# Patient Record
Sex: MALE | Race: WHITE | Employment: FULL TIME | URBAN - METROPOLITAN AREA
[De-identification: names, ages, dates, MRNs, and addresses within clinical notes are randomized per-mention and may not be internally consistent; named-entity substitution may affect disease eponyms.]

---

## 2021-03-02 ENCOUNTER — IMMUNIZATION (OUTPATIENT)
Dept: PRIMARY CARE CLINIC | Age: 56
End: 2021-03-02
Payer: COMMERCIAL

## 2021-03-02 PROCEDURE — 0011A COVID-19, MODERNA VACCINE 100MCG/0.5ML DOSE: CPT | Performed by: NURSE PRACTITIONER

## 2021-03-02 PROCEDURE — 91301 COVID-19, MODERNA VACCINE 100MCG/0.5ML DOSE: CPT | Performed by: NURSE PRACTITIONER

## 2021-03-30 ENCOUNTER — IMMUNIZATION (OUTPATIENT)
Dept: PRIMARY CARE CLINIC | Age: 56
End: 2021-03-30
Payer: COMMERCIAL

## 2021-03-30 PROCEDURE — 0012A COVID-19, MODERNA VACCINE 100MCG/0.5ML DOSE: CPT | Performed by: NURSE PRACTITIONER

## 2021-03-30 PROCEDURE — 91301 COVID-19, MODERNA VACCINE 100MCG/0.5ML DOSE: CPT | Performed by: NURSE PRACTITIONER

## 2021-11-10 ENCOUNTER — IMMUNIZATION (OUTPATIENT)
Dept: PRIMARY CARE CLINIC | Age: 56
End: 2021-11-10
Payer: COMMERCIAL

## 2021-11-10 PROCEDURE — 91306 COVID-19, MODERNA BOOSTER VACCINE 0.25ML DOSE: CPT | Performed by: NURSE PRACTITIONER

## 2021-11-10 PROCEDURE — 0064A COVID-19, MODERNA BOOSTER VACCINE 0.25ML DOSE: CPT | Performed by: NURSE PRACTITIONER

## 2022-05-02 ENCOUNTER — IMMUNIZATION (OUTPATIENT)
Dept: PRIMARY CARE CLINIC | Age: 57
End: 2022-05-02
Payer: COMMERCIAL

## 2022-05-02 PROCEDURE — 91306 COVID-19, MODERNA BOOSTER VACCINE 0.25ML DOSE: CPT | Performed by: NURSE PRACTITIONER

## 2022-05-02 PROCEDURE — 0064A COVID-19, MODERNA BOOSTER VACCINE 0.25ML DOSE: CPT | Performed by: NURSE PRACTITIONER

## 2024-01-29 ENCOUNTER — APPOINTMENT (OUTPATIENT)
Dept: GENERAL RADIOLOGY | Age: 59
End: 2024-01-29
Payer: COMMERCIAL

## 2024-01-29 ENCOUNTER — HOSPITAL ENCOUNTER (EMERGENCY)
Age: 59
Discharge: HOME | End: 2024-01-29
Attending: EMERGENCY MEDICINE
Payer: COMMERCIAL

## 2024-01-29 VITALS
OXYGEN SATURATION: 98 % | HEART RATE: 94 BPM | BODY MASS INDEX: 28.04 KG/M2 | TEMPERATURE: 98.4 F | RESPIRATION RATE: 15 BRPM | HEIGHT: 68 IN | DIASTOLIC BLOOD PRESSURE: 65 MMHG | SYSTOLIC BLOOD PRESSURE: 115 MMHG | WEIGHT: 185 LBS

## 2024-01-29 DIAGNOSIS — M25.561 ACUTE PAIN OF RIGHT KNEE: Primary | ICD-10-CM

## 2024-01-29 DIAGNOSIS — M25.461 EFFUSION OF RIGHT KNEE: ICD-10-CM

## 2024-01-29 LAB
ALBUMIN SERPL-MCNC: 3.9 G/DL (ref 3.5–5.2)
ALP SERPL-CCNC: 104 U/L (ref 40–129)
ALT SERPL-CCNC: 19 U/L (ref 0–40)
ANION GAP SERPL CALCULATED.3IONS-SCNC: 12 MMOL/L (ref 7–16)
AST SERPL-CCNC: 18 U/L (ref 0–39)
BASOPHILS # BLD: 0.03 K/UL (ref 0–0.2)
BASOPHILS NFR BLD: 0 % (ref 0–2)
BILIRUB SERPL-MCNC: 1.1 MG/DL (ref 0–1.2)
BUN SERPL-MCNC: 17 MG/DL (ref 6–23)
CALCIUM SERPL-MCNC: 9.3 MG/DL (ref 8.6–10.2)
CHLORIDE SERPL-SCNC: 98 MMOL/L (ref 98–107)
CO2 SERPL-SCNC: 25 MMOL/L (ref 22–29)
CREAT SERPL-MCNC: 0.8 MG/DL (ref 0.7–1.2)
CRP SERPL HS-MCNC: 171 MG/L (ref 0–5)
EOSINOPHIL # BLD: 0.02 K/UL (ref 0.05–0.5)
EOSINOPHILS RELATIVE PERCENT: 0 % (ref 0–6)
ERYTHROCYTE [DISTWIDTH] IN BLOOD BY AUTOMATED COUNT: 11.9 % (ref 11.5–15)
ERYTHROCYTE [SEDIMENTATION RATE] IN BLOOD BY WESTERGREN METHOD: 95 MM/HR (ref 0–15)
GFR SERPL CREATININE-BSD FRML MDRD: >60 ML/MIN/1.73M2
GLUCOSE SERPL-MCNC: 109 MG/DL (ref 74–99)
HCT VFR BLD AUTO: 42.9 % (ref 37–54)
HGB BLD-MCNC: 14.2 G/DL (ref 12.5–16.5)
IMM GRANULOCYTES # BLD AUTO: 0.03 K/UL (ref 0–0.58)
IMM GRANULOCYTES NFR BLD: 0 % (ref 0–5)
LACTATE BLDV-SCNC: 1 MMOL/L (ref 0.5–2.2)
LYMPHOCYTES NFR BLD: 1.29 K/UL (ref 1.5–4)
LYMPHOCYTES RELATIVE PERCENT: 12 % (ref 20–42)
MCH RBC QN AUTO: 29 PG (ref 26–35)
MCHC RBC AUTO-ENTMCNC: 33.1 G/DL (ref 32–34.5)
MCV RBC AUTO: 87.7 FL (ref 80–99.9)
MONOCYTES NFR BLD: 1.07 K/UL (ref 0.1–0.95)
MONOCYTES NFR BLD: 10 % (ref 2–12)
NEUTROPHILS NFR BLD: 77 % (ref 43–80)
NEUTS SEG NFR BLD: 8.01 K/UL (ref 1.8–7.3)
PLATELET # BLD AUTO: 275 K/UL (ref 130–450)
PMV BLD AUTO: 9.3 FL (ref 7–12)
POTASSIUM SERPL-SCNC: 4.7 MMOL/L (ref 3.5–5)
PROT SERPL-MCNC: 8.1 G/DL (ref 6.4–8.3)
RBC # BLD AUTO: 4.89 M/UL (ref 3.8–5.8)
SODIUM SERPL-SCNC: 135 MMOL/L (ref 132–146)
WBC OTHER # BLD: 10.5 K/UL (ref 4.5–11.5)

## 2024-01-29 PROCEDURE — 6370000000 HC RX 637 (ALT 250 FOR IP): Performed by: PHYSICIAN ASSISTANT

## 2024-01-29 PROCEDURE — 73562 X-RAY EXAM OF KNEE 3: CPT

## 2024-01-29 PROCEDURE — 80053 COMPREHEN METABOLIC PANEL: CPT

## 2024-01-29 PROCEDURE — 85652 RBC SED RATE AUTOMATED: CPT

## 2024-01-29 PROCEDURE — 86140 C-REACTIVE PROTEIN: CPT

## 2024-01-29 PROCEDURE — 85025 COMPLETE CBC W/AUTO DIFF WBC: CPT

## 2024-01-29 PROCEDURE — 99284 EMERGENCY DEPT VISIT MOD MDM: CPT

## 2024-01-29 PROCEDURE — 83605 ASSAY OF LACTIC ACID: CPT

## 2024-01-29 RX ORDER — SILODOSIN 8 MG/1
8 CAPSULE ORAL DAILY
COMMUNITY

## 2024-01-29 RX ORDER — HYDROCODONE BITARTRATE AND ACETAMINOPHEN 5; 325 MG/1; MG/1
1 TABLET ORAL ONCE
Status: COMPLETED | OUTPATIENT
Start: 2024-01-29 | End: 2024-01-29

## 2024-01-29 RX ORDER — SACUBITRIL AND VALSARTAN 24; 26 MG/1; MG/1
1 TABLET, FILM COATED ORAL 2 TIMES DAILY
COMMUNITY

## 2024-01-29 RX ORDER — HYDROCODONE BITARTRATE AND ACETAMINOPHEN 5; 325 MG/1; MG/1
1 TABLET ORAL EVERY 8 HOURS PRN
Qty: 9 TABLET | Refills: 0 | Status: SHIPPED | OUTPATIENT
Start: 2024-01-29 | End: 2024-02-01

## 2024-01-29 RX ORDER — METHYLPREDNISOLONE 4 MG/1
TABLET ORAL
Qty: 21 TABLET | Refills: 0 | Status: SHIPPED | OUTPATIENT
Start: 2024-01-29 | End: 2024-02-04

## 2024-01-29 RX ORDER — ASPIRIN 81 MG/1
81 TABLET, CHEWABLE ORAL DAILY
COMMUNITY

## 2024-01-29 RX ORDER — EPLERENONE 25 MG/1
25 TABLET, FILM COATED ORAL DAILY
COMMUNITY

## 2024-01-29 RX ORDER — ROSUVASTATIN CALCIUM 20 MG/1
20 TABLET, COATED ORAL DAILY
COMMUNITY

## 2024-01-29 RX ADMIN — HYDROCODONE BITARTRATE AND ACETAMINOPHEN 1 TABLET: 5; 325 TABLET ORAL at 12:26

## 2024-01-29 RX ADMIN — HYDROCODONE BITARTRATE AND ACETAMINOPHEN 1 TABLET: 5; 325 TABLET ORAL at 17:34

## 2024-01-29 ASSESSMENT — PAIN DESCRIPTION - ORIENTATION: ORIENTATION: RIGHT

## 2024-01-29 ASSESSMENT — PAIN SCALES - GENERAL
PAINLEVEL_OUTOF10: 8
PAINLEVEL_OUTOF10: 6
PAINLEVEL_OUTOF10: 7
PAINLEVEL_OUTOF10: 7

## 2024-01-29 ASSESSMENT — PAIN - FUNCTIONAL ASSESSMENT: PAIN_FUNCTIONAL_ASSESSMENT: 0-10

## 2024-01-29 ASSESSMENT — PAIN DESCRIPTION - LOCATION: LOCATION: KNEE

## 2024-01-29 NOTE — ED TRIAGE NOTES
Department of Emergency Medicine    FIRST PROVIDER TRIAGE NOTE             Independent MLP           1/29/24  11:16 AM EST    Date of Encounter: 1/29/24   MRN: [id number]    Vitals:    01/29/24 1113   BP: 112/65   Pulse: (!) 102   Temp: 97.9 °F (36.6 °C)   TempSrc: Temporal   SpO2: 96%   Weight: 83.9 kg (185 lb)   Height: 1.727 m (5' 8\")      HPI: Anson Rubi is a 59 y.o. male who presents to the ED for Knee Pain (R. Denies injury)     Pt takes Xarelto daily but did not take this morning     Denies fall    ROS: Negative for back pain.    Physical Exam:   Gen Appearance/Constitutional: alert  CV: regular rate  Right knee: Moderate edema, Mild tenderness      Initial Plan of Care: All treatment areas with department are currently occupied.     Plan to order/Initiate the following while awaiting opening in ED: Triage evaluation  imaging studies.    Initial Plan of Care: Initiate Treatment-Testing, Proceed toTreatment Area When Bed Available for ED Attending/MLP to Continue Care    Electronically signed by Romi Smith PA-C   DD: 1/29/24

## 2024-01-29 NOTE — ED PROVIDER NOTES
Shared KELLIE-ED Attending Visit.  CC: No      [facility]  Department of Emergency Medicine   ED  Encounter Note  Admit Date/RoomTime: 2024 12:19 PM  ED Room:     NAME: Anson Rubi  : 1965  MRN: [id number]     Chief Complaint:  Knee Pain (R. Denies injury)    History of Present Illness       Anson Rubi is a 59 y.o. old male who presents to the emergency department by private vehicle, for non-traumatic pain and swelling to right knee  which occured 5 day(s) prior to arrival.  The complaint is due to no known injury.  Since onset the symptoms have been gradually worsening.  Pt notes many years of R knee pain that waxes and wanes. Denies ever seeing ortho for the pain. States if he overuses it the knee will swell up and hurt. He states normally ice and rest alleviate the pain. Notes that since Wednesday of last week he has had trouble ambulating due to the pain and swelling. States he recently put in juice so was on his knees a lot and thinks that with work aggravated the knee pain.  His pain is aggraveated by any movement, any use of, or pressure on or palpation of painful area and relieved by nothing. His weight bearing ability is: not possible secondary to pain. He denies any head injury, headache, loss of consciousness, confusion, dizziness, neck pain, chest pain, SOB, calf pain, calf swelling, abdominal pain, back pain, extremity injury, numbness, weakness, blurred vision, nausea, vomiting, fever, chills, wounds, or rash. Pt notes he is on Xarelto and takes the medication as prescribed. Denies radiation of the pain. He denies knee replacement, being a diabetic, open wounds, or IV drug use.    ROS   Pertinent positives and negatives are stated within HPI, all other systems reviewed and are negative.    Past Medical History:  has a past medical history of Hyperlipidemia, Hypertension, and Myocardial infarct (HCC).    Surgical History:  has no past surgical history on

## 2024-02-01 ENCOUNTER — HOSPITAL ENCOUNTER (EMERGENCY)
Age: 59
Discharge: HOME | End: 2024-02-01
Attending: STUDENT IN AN ORGANIZED HEALTH CARE EDUCATION/TRAINING PROGRAM
Payer: COMMERCIAL

## 2024-02-01 ENCOUNTER — APPOINTMENT (OUTPATIENT)
Dept: GENERAL RADIOLOGY | Age: 59
End: 2024-02-01
Payer: COMMERCIAL

## 2024-02-01 ENCOUNTER — APPOINTMENT (OUTPATIENT)
Dept: ULTRASOUND IMAGING | Age: 59
End: 2024-02-01
Payer: COMMERCIAL

## 2024-02-01 VITALS
OXYGEN SATURATION: 98 % | DIASTOLIC BLOOD PRESSURE: 86 MMHG | HEART RATE: 97 BPM | HEIGHT: 68 IN | WEIGHT: 185 LBS | TEMPERATURE: 98.2 F | SYSTOLIC BLOOD PRESSURE: 135 MMHG | BODY MASS INDEX: 28.04 KG/M2 | RESPIRATION RATE: 14 BRPM

## 2024-02-01 DIAGNOSIS — I82.451 ACUTE DEEP VEIN THROMBOSIS (DVT) OF RIGHT PERONEAL VEIN (HCC): Primary | ICD-10-CM

## 2024-02-01 DIAGNOSIS — M25.461 EFFUSION OF RIGHT KNEE: ICD-10-CM

## 2024-02-01 LAB
ALBUMIN SERPL-MCNC: 3.9 G/DL (ref 3.5–5.2)
ALP SERPL-CCNC: 115 U/L (ref 40–129)
ALT SERPL-CCNC: 109 U/L (ref 0–40)
ANION GAP SERPL CALCULATED.3IONS-SCNC: 11 MMOL/L (ref 7–16)
AST SERPL-CCNC: 65 U/L (ref 0–39)
BASOPHILS # BLD: 0.04 K/UL (ref 0–0.2)
BASOPHILS NFR BLD: 0 % (ref 0–2)
BILIRUB SERPL-MCNC: 0.5 MG/DL (ref 0–1.2)
BUN SERPL-MCNC: 21 MG/DL (ref 6–23)
CALCIUM SERPL-MCNC: 10 MG/DL (ref 8.6–10.2)
CHLORIDE SERPL-SCNC: 107 MMOL/L (ref 98–107)
CO2 SERPL-SCNC: 24 MMOL/L (ref 22–29)
CREAT SERPL-MCNC: 0.8 MG/DL (ref 0.7–1.2)
CRP SERPL HS-MCNC: 57 MG/L (ref 0–5)
EOSINOPHIL # BLD: 0.08 K/UL (ref 0.05–0.5)
EOSINOPHILS RELATIVE PERCENT: 1 % (ref 0–6)
ERYTHROCYTE [DISTWIDTH] IN BLOOD BY AUTOMATED COUNT: 11.9 % (ref 11.5–15)
ERYTHROCYTE [SEDIMENTATION RATE] IN BLOOD BY WESTERGREN METHOD: 60 MM/HR (ref 0–15)
GFR SERPL CREATININE-BSD FRML MDRD: >60 ML/MIN/1.73M2
GLUCOSE SERPL-MCNC: 106 MG/DL (ref 74–99)
HCT VFR BLD AUTO: 46.7 % (ref 37–54)
HGB BLD-MCNC: 15.4 G/DL (ref 12.5–16.5)
IMM GRANULOCYTES # BLD AUTO: 0.05 K/UL (ref 0–0.58)
IMM GRANULOCYTES NFR BLD: 0 % (ref 0–5)
LACTATE BLDV-SCNC: 1.3 MMOL/L (ref 0.5–2.2)
LYMPHOCYTES NFR BLD: 2.37 K/UL (ref 1.5–4)
LYMPHOCYTES RELATIVE PERCENT: 16 % (ref 20–42)
MCH RBC QN AUTO: 28.9 PG (ref 26–35)
MCHC RBC AUTO-ENTMCNC: 33 G/DL (ref 32–34.5)
MCV RBC AUTO: 87.6 FL (ref 80–99.9)
MONOCYTES NFR BLD: 1.36 K/UL (ref 0.1–0.95)
MONOCYTES NFR BLD: 9 % (ref 2–12)
NEUTROPHILS NFR BLD: 73 % (ref 43–80)
NEUTS SEG NFR BLD: 10.75 K/UL (ref 1.8–7.3)
PLATELET # BLD AUTO: 369 K/UL (ref 130–450)
PMV BLD AUTO: 8.8 FL (ref 7–12)
POTASSIUM SERPL-SCNC: 4.6 MMOL/L (ref 3.5–5)
PROT SERPL-MCNC: 8.4 G/DL (ref 6.4–8.3)
RBC # BLD AUTO: 5.33 M/UL (ref 3.8–5.8)
SODIUM SERPL-SCNC: 142 MMOL/L (ref 132–146)
WBC OTHER # BLD: 14.7 K/UL (ref 4.5–11.5)

## 2024-02-01 PROCEDURE — 99284 EMERGENCY DEPT VISIT MOD MDM: CPT

## 2024-02-01 PROCEDURE — 93971 EXTREMITY STUDY: CPT

## 2024-02-01 PROCEDURE — 73562 X-RAY EXAM OF KNEE 3: CPT

## 2024-02-01 PROCEDURE — 85652 RBC SED RATE AUTOMATED: CPT

## 2024-02-01 PROCEDURE — 85025 COMPLETE CBC W/AUTO DIFF WBC: CPT

## 2024-02-01 PROCEDURE — 86140 C-REACTIVE PROTEIN: CPT

## 2024-02-01 PROCEDURE — 83605 ASSAY OF LACTIC ACID: CPT

## 2024-02-01 PROCEDURE — 6370000000 HC RX 637 (ALT 250 FOR IP): Performed by: STUDENT IN AN ORGANIZED HEALTH CARE EDUCATION/TRAINING PROGRAM

## 2024-02-01 PROCEDURE — 2580000003 HC RX 258: Performed by: STUDENT IN AN ORGANIZED HEALTH CARE EDUCATION/TRAINING PROGRAM

## 2024-02-01 PROCEDURE — 80053 COMPREHEN METABOLIC PANEL: CPT

## 2024-02-01 RX ORDER — 0.9 % SODIUM CHLORIDE 0.9 %
1000 INTRAVENOUS SOLUTION INTRAVENOUS ONCE
Status: COMPLETED | OUTPATIENT
Start: 2024-02-01 | End: 2024-02-01

## 2024-02-01 RX ADMIN — SODIUM CHLORIDE 500 ML: 9 INJECTION, SOLUTION INTRAVENOUS at 13:02

## 2024-02-01 RX ADMIN — APIXABAN 10 MG: 5 TABLET, FILM COATED ORAL at 17:38

## 2024-02-01 ASSESSMENT — LIFESTYLE VARIABLES
HOW OFTEN DO YOU HAVE A DRINK CONTAINING ALCOHOL: NEVER
HOW MANY STANDARD DRINKS CONTAINING ALCOHOL DO YOU HAVE ON A TYPICAL DAY: PATIENT DOES NOT DRINK

## 2024-02-01 NOTE — DISCHARGE INSTR - COC
Continuity of Care Form    Patient Name: Anson Rubi   :  1965  MRN:  [id number]    Admit date:  2024  Discharge date:  ***    Code Status Order: No Order   Advance Directives:     Admitting Physician:  No admitting provider for patient encounter.  PCP: Kaylin Driver MD    Discharging Nurse: ***  Discharging Hospital Unit/Room#: 32/32  Discharging Unit Phone Number: ***    Emergency Contact:   Extended Emergency Contact Information  Primary Emergency Contact: Emma Rubi  Address: [address]  Home Phone: [phone]  Mobile Phone: [phone]  Relation: Spouse    Past Surgical History:  No past surgical history on file.    Immunization History:   Immunization History   Administered Date(s) Administered    COVID-19, MODERNA BLUE border, Primary or Immunocompromised, (age 12y+), IM, 100 mcg/0.5mL 2021, 2021    COVID-19, MODERNA Booster BLUE border, (age 18y+), IM, 50mcg/0.25mL 11/10/2021, 2022       Active Problems:  There is no problem list on file for this patient.      Isolation/Infection:   Isolation            No Isolation          Patient Infection Status       None to display            Nurse Assessment:  Last Vital Signs: /86   Pulse 97   Temp 98.2 °F (36.8 °C) (Oral)   Resp 14   Ht 1.727 m (5' 8\")   Wt 83.9 kg (185 lb)   SpO2 98%   BMI 28.13 kg/m²     Last documented pain score (0-10 scale):    Last Weight:   Wt Readings from Last 1 Encounters:   24 83.9 kg (185 lb)     Mental Status:  {IP PT MENTAL STATUS:05832}    IV Access:  { CHARISSA IV ACCESS:841772248}    Nursing Mobility/ADLs:  Walking   {CHP DME ADLs:914621399}  Transfer  {CHP DME ADLs:189616244}  Bathing  {CHP DME ADLs:822549797}  Dressing  {CHP DME ADLs:080031369}  Toileting  {CHP DME ADLs:478965262}  Feeding  {CHP DME ADLs:946454099}  Med Admin  {CHP DME ADLs:599337738}  Med Delivery   { CHARISSA MED Delivery:958971499}    Wound Care

## 2024-02-01 NOTE — ED NOTES
Department of Emergency Medicine  FIRST PROVIDER TRIAGE NOTE             Independent MLP           2/1/24  11:32 AM EST    Date of Encounter: 2/1/24   MRN: [id number]      HPI: Anson Rubi is a 59 y.o. male who presents to the ED for Joint Swelling (Rt knee pain and swelling Pt seen in this ed for same 3 days ago)  Pain to the right knee with calf swelling.  Was seen by PCP today and I reviewed the CRP and sed rate that was drawn in the ER a few days ago which was elevated and was advised to come back here for evaluation.  Patient denies any known injury.  Has any fever.    ROS: Negative for cp or sob.    PE: Gen Appearance/Constitutional: alert  CV: regular rate     Initial Plan of Care: All treatment areas with department are currently occupied. Plan to order/Initiate the following while awaiting opening in ED: labs and imaging studies.  Initiate Treatment-Testing, Proceed toTreatment Area When Bed Available for ED Attending/MLP to Continue Care    Electronically signed by DENEEN Nieto CNP   DD: 2/1/24       Amee Dixon APRN - CNP  02/01/24 1133

## 2024-02-01 NOTE — ED PROVIDER NOTES
[facility] EMERGENCY DEPARTMENT  EMERGENCY DEPARTMENT ENCOUNTER        Pt Name: Anson Rubi  MRN: [id number]  Birthdate 1965  Date of evaluation: 2/1/2024  Provider: John Centeno DO  PCP: Kaylin Driver MD  Note Started: 12:21 PM EST 2/1/24    CHIEF COMPLAINT       Chief Complaint   Patient presents with    Joint Swelling     Rt knee pain and swelling Pt seen in this ed for same 3 days ago       HISTORY OF PRESENT ILLNESS: 1 or more Elements   History From: Patient    Limitations to history : None    Anson Rubi is a 59 y.o. male who presents for right knee pain    Nursing Notes were all reviewed and agreed with or any disagreements were addressed in the HPI.    Patient states that he has had chronic intermittent pain in his knees. 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  last week started noting worsening pain and swelling in his right knee.  He did not have any recent trauma to the right knee went to the emergency department 3 days ago and patient was diagnosed with right knee effusion and was discharged on steroids.  His PCP was looking over his lab work from the emergency visit and noted that he had very high CRP and sed rate.  Recommended him to come the emergency department.  Here patient states that his knee is more swollen and warm and is painful in the front and in the back.  He also has some swelling translating down the leg and a little bit of right lower foot numbness.  He was previously on Xarelto low-dose due to remote history of cardiac stent.  However he just stopped the Xarelto this last Saturday, 4 days ago, as instructed by his doctor.  Patient denies any recent fevers or chills.  Patient denies any chest pain, cough, shortness of breath.  Patient denies any abdominal pain nausea or vomiting.  Does report minor diarrhea which he associates with the steroids.    No history of right knee prosthesis.  No history of gout or

## 2024-02-01 NOTE — ED PROVIDER NOTES
[facility] EMERGENCY DEPARTMENT  EMERGENCY DEPARTMENT ENCOUNTER    Pt Name: Anson Rubi  MRN: [id number]  Birthdate 1965  Date of evaluation: 2/1/2024  Provider: Cosmo Moore MD  PCP: Kaylin Driver MD  Note Started: 12:21 PM EST 2/1/24    HPI     Patient is a 59 y.o. male presents with a chief complaint of   Chief Complaint   Patient presents with    Joint Swelling     Rt knee pain and swelling Pt seen in this ed for same 3 days ago   .    Patient presents for joint swelling of the right knee.  Patient reportedly has been in the emergency room 3 days ago for similar symptoms.  Patient was previously on anticoagulation but stopped again after being told by his physician.  Patient denies any fevers or chills.  Denies any chest pain or shortness of breath.  Patient states that the pain has been getting worse over the past couple days.  Patient did have an elevated CRP as well as sed rate his an outpatient was sent into the emergency room to be evaluated for possible septic joint.    Nursing Notes were all reviewed and agreed with or any disagreements were addressed in the HPI.    History From: Patient    Review of Systems   Pertinent positives and negatives as per HPI.     Physical Exam  Vitals and nursing note reviewed.   Constitutional:       Appearance: He is well-developed.   HENT:      Head: Normocephalic and atraumatic.   Eyes:      Conjunctiva/sclera: Conjunctivae normal.   Cardiovascular:      Rate and Rhythm: Normal rate and regular rhythm.      Heart sounds: Normal heart sounds. No murmur heard.  Pulmonary:      Effort: Pulmonary effort is normal. No respiratory distress.      Breath sounds: Normal breath sounds. No wheezing or rales.   Abdominal:      General: Bowel sounds are normal.      Palpations: Abdomen is soft.      Tenderness: There is no abdominal tenderness. There is no guarding or rebound.   Musculoskeletal:         General: Tenderness present. No

## 2024-02-29 DIAGNOSIS — R20.2 PARESTHESIA: Primary | ICD-10-CM

## 2024-03-01 ENCOUNTER — PROCEDURE VISIT (OUTPATIENT)
Dept: PHYSICAL MEDICINE AND REHAB | Age: 59
End: 2024-03-01
Payer: COMMERCIAL

## 2024-03-01 VITALS — HEIGHT: 68 IN | WEIGHT: 185 LBS | BODY MASS INDEX: 28.04 KG/M2

## 2024-03-01 DIAGNOSIS — R20.2 PARESTHESIA: Primary | ICD-10-CM

## 2024-03-01 PROCEDURE — 99203 OFFICE O/P NEW LOW 30 MIN: CPT | Performed by: PHYSICAL MEDICINE & REHABILITATION

## 2024-03-01 PROCEDURE — 95909 NRV CNDJ TST 5-6 STUDIES: CPT | Performed by: PHYSICAL MEDICINE & REHABILITATION

## 2024-03-01 PROCEDURE — 95885 MUSC TST DONE W/NERV TST LIM: CPT | Performed by: PHYSICAL MEDICINE & REHABILITATION

## 2024-03-01 NOTE — PROGRESS NOTES
Electrodiagnostic Laboratory  *Accredited by the [facility] with exemplary status  [address] [place] [address]  Phone: [phone]  Fax: [phone]      Date of Examination: 03/06/24    Patient Name: Anson Rubi    An independent historian was not needed.     Anson Rubi  is a 59 y.o. year old male who was seen today regarding   Chief Complaint   Patient presents with    Extremity Pain     Pain in right foot described as needles.  Symptoms for 3 weeks.      Numbness     Right foot numbness after knee draining.   The symptoms are intermittent.       I have reviewed the referring provider's office note.    There is not a family history of neuromuscular conditions.     Physical Exam: General: The patient is in no apparent distress.  MSK: There is no joint effusion, deformity, instability, swelling, erythema or warmth.  AROM is full in the spine and extremities. SLR is negative. Neurologic:  No focal sensorimotor deficit.  Reflexes 2+ and symmetric. Gait is normal.    Impression:     1. Paresthesia        Plan:   EMG is indicated to evaluate the above diagnosis.    EMG was done today and showed a right S1 radiculopathy.   Correlation with lumbar imaging is recommended.   Consider PT and if not improving ES can be considered.   The patient was educated about the diagnosis and the prognosis.   Advised patient to follow up with referring provider.       Thank you for allowing me to participate in the care of your patient.      Sincerely,       Jacqueline Stone D.O., P.T.  Board Certified Physical Medicine and Rehabilitation  Board Certified Electrodiagnostic Medicine                 
There is not a prior study for comparison.    Follow up EMG is recommended if clinically warranted.     Technologist: Nena Dee  Physician:    Jacqueline Stone D.O., P.T.  Board Certified Physical Medicine and Rehabilitation  Board Certified Electrodiagnostic Medicine      Nerve conduction studies and electromyography were performed according to our laboratory policies and procedures which can be provided upon request. All abnormal values are identified in the table. Laboratory normal values can also be provided upon request.       Cc: Kaylin Driver MD Vaturi, Shani, MD

## 2024-03-01 NOTE — PATIENT INSTRUCTIONS
Electrodiagnotic Laboratory  [facility] the [facility] with [facility]  KHADIJAH Alvarenga D.O.   [facility]  [address] [place] [address]  Phone: [phone]  Fax: [phone]        Today you had an electrodiagnostic exam which included nerve conduction studies (NCS) and electromyography (EMG). This test evaluated the electrical activity of your nerves and muscles to help determine if you have a nerve or muscle disease.  This test can help determine the location and type of a nerve or muscle problem. This will help your referring doctor diagnose your condition and determine the appropriate next step in your treatment plan.     After your test:    1. There are no long lasting side effects of the test.     2. You may resume your normal activities without restrictions.     3.  Resume any medications that were stopped for the test.     4  If you have sore areas or bruising in your muscles where the needle was placed, apply a cold pack to the sore area for 15-20 minutes three to four times a day as needed for pain.  The soreness should go away in about 1-2 days.     5. Your results were provided  Briefly at the end of your test and the final detailed report will be provided to your referring physician, and/or primary care physician and any other parties you requested within 1-2 days of the examination. You may wish to contact your referring provider after a few days to determine what they would like you to do next.     6.  Please call [phone] with any questions or concerns and if you develop increased body temperature/fever, swelling, tenderness, increased pain and/or drainage from the sites where the needle was placed.     Thank you for choosing us for your health care needs.

## 2024-03-06 DIAGNOSIS — R20.2 PARESTHESIA: ICD-10-CM

## 2025-07-18 ENCOUNTER — TELEPHONE (OUTPATIENT)
Dept: ENT CLINIC | Age: 60
End: 2025-07-18

## 2025-07-18 DIAGNOSIS — K11.8 SUBMANDIBULAR GLAND MASS: Primary | ICD-10-CM

## 2025-07-18 DIAGNOSIS — Z01.812 PRE-OPERATIVE LABORATORY EXAMINATION: Primary | ICD-10-CM

## 2025-07-18 NOTE — TELEPHONE ENCOUNTER
Called patient and advised patient US head and neck recommended further evaluation CT neck with contrast. Patient understood. Advised patient order was put in and radiology will give patient a call to scheduled. Once patient gets scheduled call office to schedule appointment in office to review results. Patient understood.

## 2025-08-08 ENCOUNTER — TELEPHONE (OUTPATIENT)
Dept: ENT CLINIC | Age: 60
End: 2025-08-08

## 2025-08-13 ENCOUNTER — OFFICE VISIT (OUTPATIENT)
Dept: ENT CLINIC | Age: 60
End: 2025-08-13
Payer: COMMERCIAL

## 2025-08-13 VITALS
WEIGHT: 182 LBS | BODY MASS INDEX: 26.05 KG/M2 | TEMPERATURE: 98 F | RESPIRATION RATE: 12 BRPM | HEART RATE: 70 BPM | OXYGEN SATURATION: 95 % | HEIGHT: 70 IN | DIASTOLIC BLOOD PRESSURE: 72 MMHG | SYSTOLIC BLOOD PRESSURE: 120 MMHG

## 2025-08-13 DIAGNOSIS — R22.1 NECK MASS: Primary | ICD-10-CM

## 2025-08-13 DIAGNOSIS — R22.1 NECK MASS: ICD-10-CM

## 2025-08-13 PROCEDURE — 99204 OFFICE O/P NEW MOD 45 MIN: CPT | Performed by: OTOLARYNGOLOGY

## 2025-08-13 PROCEDURE — 31575 DIAGNOSTIC LARYNGOSCOPY: CPT | Performed by: OTOLARYNGOLOGY

## 2025-08-13 RX ORDER — RIVAROXABAN 2.5 MG/1
1 TABLET, FILM COATED ORAL 2 TIMES DAILY
COMMUNITY
Start: 2023-10-23

## 2025-08-14 ENCOUNTER — TELEPHONE (OUTPATIENT)
Dept: OTOLARYNGOLOGY | Age: 60
End: 2025-08-14

## 2025-08-14 DIAGNOSIS — R22.1 NECK MASS: Primary | ICD-10-CM

## 2025-08-14 DIAGNOSIS — Z01.812 PRE-OPERATIVE LABORATORY EXAMINATION: ICD-10-CM

## 2025-08-18 DIAGNOSIS — R22.1 NECK MASS: ICD-10-CM

## 2025-08-22 LAB
CATECHOLAMINE INTERPRETATION, PLASMA: ABNORMAL
DOPAMINE: <130 PMOL/L
EPINEPHRINE PLASMA: 368 PMOL/L
NOREPINEPHRINE: 4201 PMOL/L (ref 1050–4800)

## 2025-08-23 LAB
METANEPH/PLASMA INTERP: NORMAL
METANEPHRINE: 0.21 NMOL/L (ref 0–0.49)
NORMETANEPHRINE PLASMA: 0.79 NMOL/L (ref 0–0.89)